# Patient Record
Sex: MALE | Race: WHITE | Employment: FULL TIME | URBAN - METROPOLITAN AREA
[De-identification: names, ages, dates, MRNs, and addresses within clinical notes are randomized per-mention and may not be internally consistent; named-entity substitution may affect disease eponyms.]

---

## 2022-04-22 ENCOUNTER — HOSPITAL ENCOUNTER (EMERGENCY)
Age: 47
Discharge: HOME | End: 2022-04-22
Attending: EMERGENCY MEDICINE
Payer: OTHER MISCELLANEOUS

## 2022-04-22 ENCOUNTER — APPOINTMENT (OUTPATIENT)
Dept: GENERAL RADIOLOGY | Age: 47
End: 2022-04-22
Attending: EMERGENCY MEDICINE
Payer: OTHER MISCELLANEOUS

## 2022-04-22 VITALS
HEART RATE: 76 BPM | OXYGEN SATURATION: 97 % | RESPIRATION RATE: 16 BRPM | DIASTOLIC BLOOD PRESSURE: 93 MMHG | TEMPERATURE: 98 F | SYSTOLIC BLOOD PRESSURE: 159 MMHG | WEIGHT: 185 LBS

## 2022-04-22 DIAGNOSIS — S46.212A BICEPS TENDON RUPTURE, LEFT, INITIAL ENCOUNTER: Primary | ICD-10-CM

## 2022-04-22 PROCEDURE — 73060 X-RAY EXAM OF HUMERUS: CPT

## 2022-04-22 PROCEDURE — 99283 EMERGENCY DEPT VISIT LOW MDM: CPT

## 2022-04-22 RX ORDER — KETOROLAC TROMETHAMINE 30 MG/ML
30 INJECTION, SOLUTION INTRAMUSCULAR; INTRAVENOUS
Status: DISCONTINUED | OUTPATIENT
Start: 2022-04-22 | End: 2022-04-22 | Stop reason: HOSPADM

## 2022-04-22 NOTE — ED TRIAGE NOTES
Pt to ED for L bicep pain, pt was setting up a display at work when he felt a \"pop\", sensation intact and pulses noted.

## 2022-04-22 NOTE — ED PROVIDER NOTES
The history is provided by the patient and the spouse. Arm Pain   This is a new problem. The current episode started 3 to 5 hours ago. The problem occurs constantly. The problem has not changed since onset. The pain is present in the left arm. The quality of the pain is described as aching. The pain is moderate. Associated symptoms include limited range of motion. Pertinent negatives include no numbness, no stiffness, no tingling, no itching, no back pain and no neck pain. The symptoms are aggravated by contact, movement and palpation. He has tried nothing for the symptoms. There has been no history of extremity trauma. No past medical history on file. No past surgical history on file. No family history on file. Social History     Socioeconomic History    Marital status: Not on file     Spouse name: Not on file    Number of children: Not on file    Years of education: Not on file    Highest education level: Not on file   Occupational History    Not on file   Tobacco Use    Smoking status: Not on file    Smokeless tobacco: Not on file   Substance and Sexual Activity    Alcohol use: Not on file    Drug use: Not on file    Sexual activity: Not on file   Other Topics Concern    Not on file   Social History Narrative    Not on file     Social Determinants of Health     Financial Resource Strain:     Difficulty of Paying Living Expenses: Not on file   Food Insecurity:     Worried About Running Out of Food in the Last Year: Not on file    Kareem of Food in the Last Year: Not on file   Transportation Needs:     Lack of Transportation (Medical): Not on file    Lack of Transportation (Non-Medical):  Not on file   Physical Activity:     Days of Exercise per Week: Not on file    Minutes of Exercise per Session: Not on file   Stress:     Feeling of Stress : Not on file   Social Connections:     Frequency of Communication with Friends and Family: Not on file    Frequency of Social Gatherings with Friends and Family: Not on file    Attends Orthodox Services: Not on file    Active Member of Clubs or Organizations: Not on file    Attends Club or Organization Meetings: Not on file    Marital Status: Not on file   Intimate Partner Violence:     Fear of Current or Ex-Partner: Not on file    Emotionally Abused: Not on file    Physically Abused: Not on file    Sexually Abused: Not on file   Housing Stability:     Unable to Pay for Housing in the Last Year: Not on file    Number of [place] in the Last Year: Not on file    Unstable Housing in the Last Year: Not on file         ALLERGIES: Wellbutrin [bupropion]    Review of Systems   Constitutional: Negative for activity change, chills and fever. HENT: Negative for nosebleeds, sore throat, trouble swallowing and voice change. Eyes: Negative for visual disturbance. Respiratory: Negative for shortness of breath. Cardiovascular: Negative for chest pain and palpitations. Gastrointestinal: Negative for abdominal pain, constipation, diarrhea and nausea. Genitourinary: Negative for difficulty urinating, dysuria, hematuria and urgency. Musculoskeletal: Positive for myalgias. Negative for back pain, neck pain, neck stiffness and stiffness. Skin: Negative for color change and itching. Allergic/Immunologic: Negative for immunocompromised state. Neurological: Negative for dizziness, tingling, seizures, syncope, weakness, light-headedness, numbness and headaches. Psychiatric/Behavioral: Negative for behavioral problems, confusion, hallucinations, self-injury and suicidal ideas. Vitals:    04/22/22 1835 04/22/22 1837   BP: (!) 159/93    Pulse: 76    Resp: 16    Temp: 98 °F (36.7 °C)    SpO2: 97%    Weight:  83.9 kg (185 lb)            Physical Exam  Vitals and nursing note reviewed. Constitutional:       General: He is not in acute distress. Appearance: He is well-developed. He is not diaphoretic.    HENT: Head: Atraumatic. Neck:      Trachea: No tracheal deviation. Cardiovascular:      Comments: Warm and well perfused  Pulmonary:      Effort: Pulmonary effort is normal. No respiratory distress. Musculoskeletal:      Left upper arm: Tenderness present. Skin:     General: Skin is warm and dry. Neurological:      Mental Status: He is alert. Coordination: Coordination normal.   Psychiatric:         Behavior: Behavior normal.         Thought Content: Thought content normal.         Judgment: Judgment normal.          MDM     This is a 51-year-old male with past medical history, review of systems, physical exam as above, presenting with complaints of left arm pain. Patient states approximately 3 hours prior to arrival he was lifting an object at work and heard a \"pop\" in the left biceps. Patient states deformity, pain and weakness with flexion. He states he is right-hand dominant. Patient states she has not taken anything for pain. Physical exam remarkable for well-appearing middle-age male, in no acute distress with muscular deformity over the left biceps, weakness on flexion compared to right, suspicious for biceps tendon injury. Plan to order plain films to evaluate for bony abnormality, provide pain control and sling, likely discharge to follow-up with orthopedics.     Procedures

## 2022-04-22 NOTE — ED NOTES
Medium left shoulder immobilizer applied to patient. Instructed pt and spouse how to apply and remove immobilizer.

## 2022-09-28 ENCOUNTER — OFFICE VISIT (OUTPATIENT)
Dept: FAMILY MEDICINE CLINIC | Age: 47
End: 2022-09-28
Payer: COMMERCIAL

## 2022-09-28 VITALS
HEART RATE: 53 BPM | TEMPERATURE: 96.9 F | RESPIRATION RATE: 16 BRPM | OXYGEN SATURATION: 98 % | DIASTOLIC BLOOD PRESSURE: 90 MMHG | WEIGHT: 200.13 LBS | BODY MASS INDEX: 30.33 KG/M2 | HEIGHT: 68 IN | SYSTOLIC BLOOD PRESSURE: 142 MMHG

## 2022-09-28 DIAGNOSIS — Z13.220 SCREENING FOR HYPERLIPIDEMIA: ICD-10-CM

## 2022-09-28 DIAGNOSIS — R03.0 ELEVATED BLOOD PRESSURE READING WITHOUT DIAGNOSIS OF HYPERTENSION: ICD-10-CM

## 2022-09-28 DIAGNOSIS — Z12.11 COLON CANCER SCREENING: ICD-10-CM

## 2022-09-28 DIAGNOSIS — Z23 ENCOUNTER FOR IMMUNIZATION: ICD-10-CM

## 2022-09-28 DIAGNOSIS — Z00.00 WELLNESS EXAMINATION: Primary | ICD-10-CM

## 2022-09-28 DIAGNOSIS — Z76.89 ENCOUNTER TO ESTABLISH CARE: ICD-10-CM

## 2022-09-28 DIAGNOSIS — E66.09 CLASS 1 OBESITY DUE TO EXCESS CALORIES WITH SERIOUS COMORBIDITY AND BODY MASS INDEX (BMI) OF 30.0 TO 30.9 IN ADULT: ICD-10-CM

## 2022-09-28 DIAGNOSIS — Z11.59 ENCOUNTER FOR HEPATITIS C SCREENING TEST FOR LOW RISK PATIENT: ICD-10-CM

## 2022-09-28 PROBLEM — K21.9 ACID REFLUX: Status: ACTIVE | Noted: 2022-09-28

## 2022-09-28 PROCEDURE — 90714 TD VACC NO PRESV 7 YRS+ IM: CPT | Performed by: NURSE PRACTITIONER

## 2022-09-28 PROCEDURE — 99386 PREV VISIT NEW AGE 40-64: CPT | Performed by: NURSE PRACTITIONER

## 2022-09-28 PROCEDURE — 90471 IMMUNIZATION ADMIN: CPT | Performed by: NURSE PRACTITIONER

## 2022-09-28 RX ORDER — FAMOTIDINE 20 MG/1
20 TABLET, FILM COATED ORAL EVERY EVENING
COMMUNITY

## 2022-09-28 RX ORDER — FAMOTIDINE 10 MG/1
10 TABLET ORAL 2 TIMES DAILY
COMMUNITY
End: 2022-09-28 | Stop reason: DRUGHIGH

## 2022-09-28 RX ORDER — FLUTICASONE PROPIONATE 50 MCG
1 SPRAY, SUSPENSION (ML) NASAL DAILY
COMMUNITY

## 2022-09-28 NOTE — PROGRESS NOTES
Chief Complaint   Patient presents with    Annual Wellness Visit    1. \"Have you been to the ER, urgent care clinic since your last visit? Hospitalized since your last visit? \" No    2. \"Have you seen or consulted any other health care providers outside of the [address] [place] [address] since your last visit? \" No     3. For patients aged 39-70: Has the patient had a colonoscopy / FIT/ Cologuard? NA - based on age      If the patient is female:    4. For patients aged 41-77: Has the patient had a mammogram within the past 2 years? NA - based on age or sex      11. For patients aged 21-65: Has the patient had a pap smear?  NA - based on age or sex Visit Vitals  BP (!) 150/92 (BP 1 Location: Left upper arm, BP Patient Position: Sitting, BP Cuff Size: Adult)   Pulse (!) 53   Temp 96.9 °F (36.1 °C) (Temporal)   Resp 16   Ht 5' 8\" (1.727 m)   Wt 200 lb 2 oz (90.8 kg)   SpO2 98%   BMI 30.43 kg/m²      3 most recent PHQ Screens 9/28/2022   Little interest or pleasure in doing things Not at all   Feeling down, depressed, irritable, or hopeless Not at all   Total Score PHQ 2 0

## 2022-09-28 NOTE — PROGRESS NOTES
Subjective  Chief Complaint   Patient presents with    Annual Wellness Visit     HPI:  Missy Castorena is a 52 y.o. male. This is a new patient to the practice. Last seen over 3 years ago. Presents for wellness exam. Exercise was limited due to elbow injury and surgery lifting restrictions. He has returned to the gym, mostly weight lifting.      Immunizations:  Flu: declines  COVID: complete  Tetanus: ordered    HCV screening: ordered  Colon cancer screening: ordered  Smoking status: denies    Moods: at goal  PHQ2: 0/2  Diet: healthy  Exercise: regular  Vision exams: every other year  Dental exams: every 6 months    Past Medical History:   Diagnosis Date    Acid reflux     Allergic rhinitis     COVID-19 virus infection 05/2022    Wears glasses      Family History   Problem Relation Age of Onset    Diabetes Mother     High Cholesterol Father     No Known Problems Sister     OSTEOARTHRITIS Maternal Grandmother     Heart Disease Maternal Grandfather     Other Maternal Grandfather         pre diabetes    Hodgkin's lymphoma Paternal Grandmother     Hypertension Paternal Grandmother     Alcohol abuse Paternal Grandmother     GERD Paternal Grandfather     Throat cancer Paternal Grandfather      Social History     Socioeconomic History    Marital status:      Spouse name: Not on file    Number of children: Not on file    Years of education: Not on file    Highest education level: Not on file   Occupational History    Not on file   Tobacco Use    Smoking status: Never     Passive exposure: Never    Smokeless tobacco: Never   Vaping Use    Vaping Use: Never used   Substance and Sexual Activity    Alcohol use: Yes     Comment: 1-2 beer daily    Drug use: Yes     Types: Marijuana     Comment: on ocassion    Sexual activity: Yes     Partners: Female   Other Topics Concern    Not on file   Social History Narrative    Not on file     Social Determinants of Health     Financial Resource Strain: Low Risk     Difficulty of Paying Living Expenses: Not hard at all   Food Insecurity: No Food Insecurity    Worried About Running Out of Food in the Last Year: Never true    Ran Out of Food in the Last Year: Never true   Transportation Needs: Not on file   Physical Activity: Not on file   Stress: Not on file   Social Connections: Not on file   Intimate Partner Violence: Not on file   Housing Stability: Not on file     Current Outpatient Medications on File Prior to Visit   Medication Sig Dispense Refill    fluticasone propionate (FLONASE) 50 mcg/actuation nasal spray 1 Ethan by Nasal route daily. doxylamine succinate (UNISOM) 25 mg tablet Take 25 mg by mouth nightly as needed. Takes half tab      famotidine (PEPCID) 20 mg tablet Take 20 mg by mouth every evening. [DISCONTINUED] famotidine (PEPCID) 10 mg tablet Take 10 mg by mouth two (2) times a day. No current facility-administered medications on file prior to visit. Allergies   Allergen Reactions    Minocycline Hives    Wellbutrin [Bupropion] Hives     Review of Systems   Constitutional:  Negative for chills, fever and weight loss. HENT:  Positive for hearing loss. Negative for congestion, ear pain, sinus pain and sore throat. Denies difficulty swallowing. Eyes:  Negative for blurred vision. Respiratory:  Negative for cough, shortness of breath and wheezing. Cardiovascular:  Negative for chest pain, palpitations and leg swelling. Gastrointestinal:  Positive for heartburn. Negative for abdominal pain, constipation and diarrhea. Genitourinary:  Negative for dysuria. Musculoskeletal:  Positive for joint pain. Negative for myalgias. Neurological:  Positive for headaches. Negative for dizziness, tingling and weakness. Psychiatric/Behavioral:  Negative for depression. The patient is not nervous/anxious.         Objective  Visit Vitals  BP (!) 142/90   Pulse (!) 53   Temp 96.9 °F (36.1 °C) (Temporal)   Resp 16   Ht 5' 8\" (1.727 m)   Wt 200 lb 2 oz (90.8 kg)   SpO2 98%   BMI 30.43 kg/m²       Physical Exam  Vitals and nursing note reviewed. Constitutional:       General: He is not in acute distress. Appearance: Normal appearance. He is obese. HENT:      Head: Normocephalic. Eyes:      Extraocular Movements: Extraocular movements intact. Neck:      Thyroid: No thyroid mass, thyromegaly or thyroid tenderness. Cardiovascular:      Rate and Rhythm: Normal rate and regular rhythm. Heart sounds: Normal heart sounds. Pulmonary:      Effort: Pulmonary effort is normal.      Breath sounds: Normal breath sounds. Abdominal:      General: Bowel sounds are normal.      Palpations: Abdomen is soft. There is no mass. Tenderness: There is no abdominal tenderness. Musculoskeletal:         General: Normal range of motion. Cervical back: Normal range of motion and neck supple. Right lower leg: No edema. Left lower leg: No edema. Lymphadenopathy:      Cervical: No cervical adenopathy. Upper Body:      Right upper body: No supraclavicular adenopathy. Left upper body: No supraclavicular adenopathy. Skin:     General: Skin is warm and dry. Neurological:      General: No focal deficit present. Mental Status: He is alert and oriented to person, place, and time. Psychiatric:         Mood and Affect: Mood normal.         Behavior: Behavior normal.         Thought Content: Thought content normal.         Judgment: Judgment normal.        Assessment & Plan      ICD-10-CM ICD-9-CM    1. Wellness examination  Z00.00 V70.0       2. Screening for hyperlipidemia  Z13.220 V77.91 CBC WITH AUTOMATED DIFF      LIPID PANEL      METABOLIC PANEL, COMPREHENSIVE      3.  Encounter for hepatitis C screening test for low risk patient  Z11.59 V73.89 HEPATITIS C AB, RFLX TO QT BY PCR      4. Class 1 obesity due to excess calories with serious comorbidity and body mass index (BMI) of 30.0 to 30.9 in adult  E66.09 278.00     Z68.30 V85.30       5. Encounter for immunization  Z23 V03.89 TETANUS AND DIPHTHERIA TOXOIDS (TD) ADSORBED, PRES. FREE, IN INDIVIDS. >=7, IM      6. Colon cancer screening  Z12.11 V76.51 REFERRAL TO GASTROENTEROLOGY      7. Elevated blood pressure reading without diagnosis of hypertension  R03.0 796.2       8. Encounter to establish care  Z76.89 V65.8         Diagnoses and all orders for this visit:    1. Wellness examination  We are getting patient up-to-date on preventative measures as listed. 2. Screening for hyperlipidemia  Nonfasting labs. -     CBC WITH AUTOMATED DIFF  -     LIPID PANEL  -     METABOLIC PANEL, COMPREHENSIVE    3. Encounter for hepatitis C screening test for low risk patient  -     HEPATITIS C AB, RFLX TO QT BY PCR    4. Class 1 obesity due to excess calories with serious comorbidity and body mass index (BMI) of 30.0 to 30.9 in adult  He has a muscular appearance. Continue to exercise regularly and eat a healthy diet. 5. Encounter for immunization  Declines flu vaccine today. Prefers to receive from employer at a later date.  -     [address] (TD) ADSORBED, PRES. FREE, IN INDIVIDS. >=7, IM    6. Colon cancer screening  -     REFERRAL TO GASTROENTEROLOGY    7. Elevated blood pressure reading without diagnosis of hypertension  BP is above goal in the office today. he is going to check readings outside the office after sitting quietly for 5 minutes with both feet flat on the floor and arm at heart level. Instructed to call if staying consistently greater than 140/90 on either number. In the meantime, stay hydrated, lower salt in diet and increase exercise. 8. Encounter to establish care      Aspects of this note may have been generated using voice recognition software. Despite editing, there may be some syntax errors. Follow-up and Dispositions    Return in about 1 year (around 9/28/2023) for wellness, fasting labs.          I have discussed the diagnosis with the patient and the intended plan as seen in the above orders. The patient has received an after-visit summary and questions were answered concerning future plans. I have discussed medication side effects and warnings with the patient as well.     Daquan Solano NP

## 2022-09-29 LAB
ALBUMIN SERPL-MCNC: 4.5 G/DL (ref 4–5)
ALBUMIN/GLOB SERPL: 1.7 {RATIO} (ref 1.2–2.2)
ALP SERPL-CCNC: 62 IU/L (ref 44–121)
ALT SERPL-CCNC: 25 IU/L (ref 0–44)
AST SERPL-CCNC: 28 IU/L (ref 0–40)
BASOPHILS # BLD AUTO: 0 X10E3/UL (ref 0–0.2)
BASOPHILS NFR BLD AUTO: 0 %
BILIRUB SERPL-MCNC: 0.5 MG/DL (ref 0–1.2)
BUN SERPL-MCNC: 16 MG/DL (ref 6–24)
BUN/CREAT SERPL: 13 (ref 9–20)
CALCIUM SERPL-MCNC: 9.5 MG/DL (ref 8.7–10.2)
CHLORIDE SERPL-SCNC: 100 MMOL/L (ref 96–106)
CHOLEST SERPL-MCNC: 190 MG/DL (ref 100–199)
CO2 SERPL-SCNC: 25 MMOL/L (ref 20–29)
CREAT SERPL-MCNC: 1.24 MG/DL (ref 0.76–1.27)
EGFR: 72 ML/MIN/1.73
EOSINOPHIL # BLD AUTO: 0.1 X10E3/UL (ref 0–0.4)
EOSINOPHIL NFR BLD AUTO: 1 %
ERYTHROCYTE [DISTWIDTH] IN BLOOD BY AUTOMATED COUNT: 12.8 % (ref 11.6–15.4)
GLOBULIN SER CALC-MCNC: 2.7 G/DL (ref 1.5–4.5)
GLUCOSE SERPL-MCNC: 95 MG/DL (ref 70–99)
HCT VFR BLD AUTO: 45 % (ref 37.5–51)
HCV AB S/CO SERPL IA: <0.1 S/CO RATIO (ref 0–0.9)
HCV AB SERPL QL IA: NORMAL
HDLC SERPL-MCNC: 43 MG/DL
HGB BLD-MCNC: 15.1 G/DL (ref 13–17.7)
IMM GRANULOCYTES # BLD AUTO: 0 X10E3/UL (ref 0–0.1)
IMM GRANULOCYTES NFR BLD AUTO: 0 %
LDLC SERPL CALC-MCNC: 130 MG/DL (ref 0–99)
LYMPHOCYTES # BLD AUTO: 1.9 X10E3/UL (ref 0.7–3.1)
LYMPHOCYTES NFR BLD AUTO: 36 %
MCH RBC QN AUTO: 31.5 PG (ref 26.6–33)
MCHC RBC AUTO-ENTMCNC: 33.6 G/DL (ref 31.5–35.7)
MCV RBC AUTO: 94 FL (ref 79–97)
MONOCYTES # BLD AUTO: 0.5 X10E3/UL (ref 0.1–0.9)
MONOCYTES NFR BLD AUTO: 10 %
NEUTROPHILS # BLD AUTO: 2.7 X10E3/UL (ref 1.4–7)
NEUTROPHILS NFR BLD AUTO: 53 %
PLATELET # BLD AUTO: 299 X10E3/UL (ref 150–450)
POTASSIUM SERPL-SCNC: 4.6 MMOL/L (ref 3.5–5.2)
PROT SERPL-MCNC: 7.2 G/DL (ref 6–8.5)
RBC # BLD AUTO: 4.8 X10E6/UL (ref 4.14–5.8)
SODIUM SERPL-SCNC: 140 MMOL/L (ref 134–144)
TRIGL SERPL-MCNC: 93 MG/DL (ref 0–149)
VLDLC SERPL CALC-MCNC: 17 MG/DL (ref 5–40)
WBC # BLD AUTO: 5.2 X10E3/UL (ref 3.4–10.8)